# Patient Record
Sex: MALE | Race: BLACK OR AFRICAN AMERICAN | NOT HISPANIC OR LATINO | Employment: FULL TIME | URBAN - METROPOLITAN AREA
[De-identification: names, ages, dates, MRNs, and addresses within clinical notes are randomized per-mention and may not be internally consistent; named-entity substitution may affect disease eponyms.]

---

## 2017-10-15 ENCOUNTER — HOSPITAL ENCOUNTER (EMERGENCY)
Facility: HOSPITAL | Age: 52
Discharge: HOME | End: 2017-10-16
Attending: EMERGENCY MEDICINE
Payer: COMMERCIAL

## 2017-10-15 ENCOUNTER — APPOINTMENT (EMERGENCY)
Dept: CT IMAGING | Facility: HOSPITAL | Age: 52
End: 2017-10-15
Payer: COMMERCIAL

## 2017-10-15 DIAGNOSIS — R10.9 ABDOMINAL PAIN: Primary | ICD-10-CM

## 2017-10-15 LAB
ALBUMIN SERPL BCP-MCNC: 3.1 G/DL (ref 3.5–5)
ALP SERPL-CCNC: 69 U/L (ref 46–116)
ALT SERPL W P-5'-P-CCNC: 28 U/L (ref 12–78)
ANION GAP SERPL CALCULATED.3IONS-SCNC: 8 MMOL/L (ref 4–13)
AST SERPL W P-5'-P-CCNC: 22 U/L (ref 5–45)
BASOPHILS # BLD AUTO: 0.02 THOUSANDS/ΜL (ref 0–0.1)
BASOPHILS NFR BLD AUTO: 0 % (ref 0–1)
BILIRUB SERPL-MCNC: 0.3 MG/DL (ref 0.2–1)
BUN SERPL-MCNC: 13 MG/DL (ref 5–25)
CALCIUM SERPL-MCNC: 8.8 MG/DL (ref 8.3–10.1)
CHLORIDE SERPL-SCNC: 103 MMOL/L (ref 100–108)
CLARITY, POC: CLEAR
CO2 SERPL-SCNC: 28 MMOL/L (ref 21–32)
COLOR, POC: YELLOW
CREAT SERPL-MCNC: 1.13 MG/DL (ref 0.6–1.3)
EOSINOPHIL # BLD AUTO: 0.12 THOUSAND/ΜL (ref 0–0.61)
EOSINOPHIL NFR BLD AUTO: 2 % (ref 0–6)
ERYTHROCYTE [DISTWIDTH] IN BLOOD BY AUTOMATED COUNT: 14.3 % (ref 11.6–15.1)
EXT BILIRUBIN, UA: NEGATIVE
EXT BLOOD URINE: NORMAL
EXT GLUCOSE, UA: NEGATIVE
EXT KETONES: NEGATIVE
EXT NITRITE, UA: NEGATIVE
EXT PH, UA: 6
EXT PROTEIN, UA: NEGATIVE
EXT SPECIFIC GRAVITY, UA: 1.01
EXT UROBILINOGEN: NEGATIVE
GFR SERPL CREATININE-BSD FRML MDRD: 88 ML/MIN/1.73SQ M
GLUCOSE SERPL-MCNC: 83 MG/DL (ref 65–140)
HCT VFR BLD AUTO: 37.8 % (ref 36.5–49.3)
HGB BLD-MCNC: 12.9 G/DL (ref 12–17)
HOLD SPECIMEN: NORMAL
LIPASE SERPL-CCNC: 201 U/L (ref 73–393)
LYMPHOCYTES # BLD AUTO: 2.25 THOUSANDS/ΜL (ref 0.6–4.47)
LYMPHOCYTES NFR BLD AUTO: 33 % (ref 14–44)
MCH RBC QN AUTO: 34.1 PG (ref 26.8–34.3)
MCHC RBC AUTO-ENTMCNC: 34.1 G/DL (ref 31.4–37.4)
MCV RBC AUTO: 100 FL (ref 82–98)
MONOCYTES # BLD AUTO: 0.61 THOUSAND/ΜL (ref 0.17–1.22)
MONOCYTES NFR BLD AUTO: 9 % (ref 4–12)
NEUTROPHILS # BLD AUTO: 3.73 THOUSANDS/ΜL (ref 1.85–7.62)
NEUTS SEG NFR BLD AUTO: 56 % (ref 43–75)
PLATELET # BLD AUTO: 244 THOUSANDS/UL (ref 149–390)
PMV BLD AUTO: 8.6 FL (ref 8.9–12.7)
POTASSIUM SERPL-SCNC: 3.8 MMOL/L (ref 3.5–5.3)
PROT SERPL-MCNC: 6.9 G/DL (ref 6.4–8.2)
RBC # BLD AUTO: 3.78 MILLION/UL (ref 3.88–5.62)
SODIUM SERPL-SCNC: 139 MMOL/L (ref 136–145)
WBC # BLD AUTO: 6.73 THOUSAND/UL (ref 4.31–10.16)
WBC # BLD EST: NEGATIVE 10*3/UL

## 2017-10-15 PROCEDURE — 96361 HYDRATE IV INFUSION ADD-ON: CPT

## 2017-10-15 PROCEDURE — 85025 COMPLETE CBC W/AUTO DIFF WBC: CPT | Performed by: EMERGENCY MEDICINE

## 2017-10-15 PROCEDURE — 93005 ELECTROCARDIOGRAM TRACING: CPT | Performed by: EMERGENCY MEDICINE

## 2017-10-15 PROCEDURE — 36415 COLL VENOUS BLD VENIPUNCTURE: CPT

## 2017-10-15 PROCEDURE — 96375 TX/PRO/DX INJ NEW DRUG ADDON: CPT

## 2017-10-15 PROCEDURE — 74177 CT ABD & PELVIS W/CONTRAST: CPT

## 2017-10-15 PROCEDURE — 96374 THER/PROPH/DIAG INJ IV PUSH: CPT

## 2017-10-15 PROCEDURE — 81002 URINALYSIS NONAUTO W/O SCOPE: CPT | Performed by: EMERGENCY MEDICINE

## 2017-10-15 PROCEDURE — 80053 COMPREHEN METABOLIC PANEL: CPT | Performed by: EMERGENCY MEDICINE

## 2017-10-15 PROCEDURE — 83690 ASSAY OF LIPASE: CPT | Performed by: EMERGENCY MEDICINE

## 2017-10-15 RX ORDER — OXYCODONE AND ACETAMINOPHEN 10; 325 MG/1; MG/1
1 TABLET ORAL EVERY 6 HOURS PRN
COMMUNITY

## 2017-10-15 RX ORDER — ONDANSETRON 2 MG/ML
4 INJECTION INTRAMUSCULAR; INTRAVENOUS ONCE
Status: COMPLETED | OUTPATIENT
Start: 2017-10-15 | End: 2017-10-15

## 2017-10-15 RX ORDER — HYDROCODONE BITARTRATE AND ACETAMINOPHEN 5; 325 MG/1; MG/1
2 TABLET ORAL EVERY 6 HOURS PRN
COMMUNITY

## 2017-10-15 RX ADMIN — FAMOTIDINE 20 MG: 10 INJECTION, SOLUTION INTRAVENOUS at 22:53

## 2017-10-15 RX ADMIN — IOHEXOL 100 ML: 350 INJECTION, SOLUTION INTRAVENOUS at 23:49

## 2017-10-15 RX ADMIN — ONDANSETRON 4 MG: 2 INJECTION INTRAMUSCULAR; INTRAVENOUS at 22:53

## 2017-10-15 RX ADMIN — SODIUM CHLORIDE 1000 ML: 0.9 INJECTION, SOLUTION INTRAVENOUS at 22:53

## 2017-10-16 VITALS
HEART RATE: 63 BPM | TEMPERATURE: 98.7 F | SYSTOLIC BLOOD PRESSURE: 125 MMHG | WEIGHT: 162.92 LBS | DIASTOLIC BLOOD PRESSURE: 75 MMHG | OXYGEN SATURATION: 98 % | RESPIRATION RATE: 18 BRPM

## 2017-10-16 PROCEDURE — 99284 EMERGENCY DEPT VISIT MOD MDM: CPT

## 2017-10-16 RX ORDER — OMEPRAZOLE 20 MG/1
20 CAPSULE, DELAYED RELEASE ORAL DAILY
Qty: 20 CAPSULE | Refills: 0 | Status: SHIPPED | OUTPATIENT
Start: 2017-10-16 | End: 2018-04-16

## 2017-10-16 RX ORDER — METOCLOPRAMIDE 10 MG/1
10 TABLET ORAL 4 TIMES DAILY
Qty: 28 TABLET | Refills: 0 | Status: SHIPPED | OUTPATIENT
Start: 2017-10-16 | End: 2017-10-23

## 2017-10-16 NOTE — ED PROVIDER NOTES
History  Chief Complaint   Patient presents with    Abdominal Pain     Pt  with complaints of right lower quadrant abdominal pain and burning that sometimes radiates down leg  Also complains of two episodes of vomiting with dark blood in emesis, happened three days ago  Now has developed loose stools and "accidents"     Patient is a 54year old male with worsening upper abdominal pain with intermittent nausea and vomiting and diarrhea for past few days and states he vomited dark blood  No abdominal surgery  No travel  No fever  Pain not worsened or relieved by eating  No raw meat, eggs, fish or recent abx use  No recent old records from this ED seen on computer system  [facility] website checked on this patient and last Rx filled was on 10/12/17 for percocet for 12 day supply  Denies NSAID or ASA use  No urinary sx  History provided by:  Patient   used: No    Abdominal Pain   Associated symptoms: diarrhea, nausea and vomiting    Associated symptoms: no fever        Prior to Admission Medications   Prescriptions Last Dose Informant Patient Reported? Taking? HYDROcodone-acetaminophen (NORCO) 5-325 mg per tablet   Yes Yes   Sig: Take 2 tablets by mouth every 6 (six) hours as needed for pain   oxyCODONE-acetaminophen (PERCOCET)  mg per tablet   Yes Yes   Sig: Take 1 tablet by mouth every 6 (six) hours as needed for moderate pain      Facility-Administered Medications: None       Past Medical History:   Diagnosis Date    Back pain     Pinched nerve     Rheumatoid arthritis ([facility] [address] )        Past Surgical History:   Procedure Laterality Date    SURGERY SCROTAL / TESTICULAR Right     right undescended testicle as child       No family history on file  I have reviewed and agree with the history as documented      Social History   Substance Use Topics    Smoking status: Current Every Day Smoker     Packs/day: 0 50    Smokeless tobacco: Never Used    Alcohol use 12 6 oz/week     21 Cans of beer per week      Comment: 3 large cans per day        Review of Systems   Constitutional: Negative for fever  Gastrointestinal: Positive for abdominal pain, diarrhea, nausea and vomiting  Genitourinary: Negative for difficulty urinating  All other systems reviewed and are negative  Physical Exam  ED Triage Vitals   Temperature Pulse Respirations Blood Pressure SpO2   10/15/17 2100 10/15/17 2058 10/15/17 2058 10/15/17 2058 10/15/17 2058   98 7 °F (37 1 °C) 71 18 131/77 100 %      Temp Source Heart Rate Source Patient Position - Orthostatic VS BP Location FiO2 (%)   10/15/17 2100 10/15/17 2058 10/15/17 2058 10/15/17 2058 --   Oral Monitor Lying Right arm       Pain Score       10/15/17 2058       2           Physical Exam   Constitutional: He is oriented to person, place, and time  He appears well-developed and well-nourished  He appears distressed (mild)  HENT:   Head: Normocephalic and atraumatic  Mouth/Throat: Oropharynx is clear and moist    Eyes: No scleral icterus  Neck: No tracheal deviation present  Cardiovascular: Normal rate, regular rhythm and normal heart sounds  No murmur heard  Pulmonary/Chest: Effort normal and breath sounds normal  No stridor  No respiratory distress  Abdominal: Soft  Bowel sounds are normal  He exhibits no distension  There is tenderness (mild upper abdominal tenderness)  There is no rebound and no guarding  Genitourinary: Rectal exam shows guaiac negative stool  Musculoskeletal: He exhibits no edema or deformity  Neurological: He is alert and oriented to person, place, and time  Skin: Skin is warm and dry  No rash noted  Psychiatric: He has a normal mood and affect  Nursing note and vitals reviewed        ED Medications  Medications   sodium chloride 0 9 % bolus 1,000 mL (0 mL Intravenous Stopped 10/15/17 5903)   ondansetron (ZOFRAN) injection 4 mg (4 mg Intravenous Given 10/15/17 2253)   famotidine (PEPCID) injection 20 mg (20 mg Intravenous Given 10/15/17 2416)   iohexol (OMNIPAQUE) 350 MG/ML injection (MULTI-DOSE) 100 mL (100 mL Intravenous Given 10/15/17 2062)       Diagnostic Studies  Labs Reviewed   CBC AND DIFFERENTIAL - Abnormal        Result Value Ref Range Status    RBC 3 78 (*) 3 88 - 5 62 Million/uL Final     (*) 82 - 98 fL Final    MPV 8 6 (*) 8 9 - 12 7 fL Final    WBC 6 73  4 31 - 10 16 Thousand/uL Final    Hemoglobin 12 9  12 0 - 17 0 g/dL Final    Hematocrit 37 8  36 5 - 49 3 % Final    MCH 34 1  26 8 - 34 3 pg Final    MCHC 34 1  31 4 - 37 4 g/dL Final    RDW 14 3  11 6 - 15 1 % Final    Platelets 486  792 - 390 Thousands/uL Final    Neutrophils Relative 56  43 - 75 % Final    Lymphocytes Relative 33  14 - 44 % Final    Monocytes Relative 9  4 - 12 % Final    Eosinophils Relative 2  0 - 6 % Final    Basophils Relative 0  0 - 1 % Final    Neutrophils Absolute 3 73  1 85 - 7 62 Thousands/µL Final    Lymphocytes Absolute 2 25  0 60 - 4 47 Thousands/µL Final    Monocytes Absolute 0 61  0 17 - 1 22 Thousand/µL Final    Eosinophils Absolute 0 12  0 00 - 0 61 Thousand/µL Final    Basophils Absolute 0 02  0 00 - 0 10 Thousands/µL Final   COMPREHENSIVE METABOLIC PANEL - Abnormal     Albumin 3 1 (*) 3 5 - 5 0 g/dL Final    Sodium 139  136 - 145 mmol/L Final    Potassium 3 8  3 5 - 5 3 mmol/L Final    Chloride 103  100 - 108 mmol/L Final    CO2 28  21 - 32 mmol/L Final    Anion Gap 8  4 - 13 mmol/L Final    BUN 13  5 - 25 mg/dL Final    Creatinine 1 13  0 60 - 1 30 mg/dL Final    Comment: Standardized to IDMS reference method    Glucose 83  65 - 140 mg/dL Final    Comment:   If the patient is fasting, the ADA then defines impaired fasting glucose as > 100 mg/dL and diabetes as > or equal to 123 mg/dL  Specimen collection should occur prior to Sulfasalazine administration due to the potential for falsely depressed results   Specimen collection should occur prior to Sulfapyridine administration due to the potential for falsely elevated results  Calcium 8 8  8 3 - 10 1 mg/dL Final    AST 22  5 - 45 U/L Final    Comment:   Specimen collection should occur prior to Sulfasalazine administration due to the potential for falsely depressed results  ALT 28  12 - 78 U/L Final    Comment:   Specimen collection should occur prior to Sulfasalazine administration due to the potential for falsely depressed results  Alkaline Phosphatase 69  46 - 116 U/L Final    Total Protein 6 9  6 4 - 8 2 g/dL Final    Total Bilirubin 0 30  0 20 - 1 00 mg/dL Final    eGFR 88  ml/min/1 73sq m Final    Narrative:     National Kidney Disease Education Program recommendations are as follows:  GFR calculation is accurate only with a steady state creatinine  Chronic Kidney disease less than 60 ml/min/1 73 sq  meters  Kidney failure less than 15 ml/min/1 73 sq  meters  LIPASE - Normal    Lipase 201  73 - 393 u/L Final   POCT URINALYSIS DIPSTICK - Normal    Color, UA yellow   Final    Clarity, UA clear   Final    EXT Glucose, UA negative   Final    EXT Bilirubin, UA (Ref: Negative) negative   Final    EXT Ketones, UA (Ref: Negative) negative   Final    EXT Spec Grav, UA 1 015   Final    EXT Blood, UA (Ref: Negative) small   Final    EXT pH, UA 6 0   Final    EXT Protein, UA (Ref: Negative) negative   Final    EXT Urobilinogen, UA (Ref: 0 2- 1 0) negative   Final    EXT Leukocytes, UA (Ref: Negative) negative   Final    EXT Nitrite, UA (Ref: Negative) negative   Final   LIGHT BLUE TOP   GREEN / BLACK TUBE ON HOLD    Extra Tube Hold for adds-on   Final       CT abdomen pelvis with contrast   ED Interpretation   COMPARISON:   No relevant prior studies available  FINDINGS:   Lower thorax: Mild dependent atelectasis  ABDOMEN:   Liver: Hepatic steatosis  Gallbladder and bile ducts: Partially contracted gallbladder  No   calcified stones  No ductal dilation  Pancreas: Unremarkable  No mass  No ductal dilation  Spleen: Unremarkable  No splenomegaly     Adrenals: Unremarkable  No mass  Kidneys and ureters: Unremarkable  No solid mass  No   hydronephrosis  Stomach and bowel: Abundant fecal material in the colon which may   represent constipation  No   obstruction  No mucosal thickening  Appendix: Visualized without findings to suggest acute   appendicitis  PELVIS:   Bladder: Partially decompressed urinary bladder  Reproductive: Unremarkable as visualized  ABDOMEN and PELVIS:   Intraperitoneal space: Unremarkable  No free air  No significant   fluid collection  Bones/joints: Mild degenerative changes  No acute fracture  No   dislocation  Soft tissues: Unremarkable  Vasculature: Scattered atherosclerotic calcifications  No   abdominal aortic aneurysm  Lymph nodes: No pathologically enlarged lymph nodes  IMPRESSION:   1  Hepatic steatosis  2  Abundant fecal material in the colon which may represent   constipation  Thank you for allowing us to participate in the care of your   patient  Dictated and Authenticated by: Brian Garcia MD   10/16/2017 12:20 AM [place] Time ([id number])          Procedures  ECG 12 Lead Documentation  Date/Time: 10/15/2017 10:49 PM  Performed by: Tata Villa  Authorized by: Tata Villa     Indications / Diagnosis:  Abdominal pain  ECG reviewed by me, the ED Provider: yes    Patient location:  ED  Previous ECG:     Previous ECG:  Unavailable  Interpretation:     Interpretation: normal    Rate:     ECG rate:  60    ECG rate assessment: normal    Rhythm:     Rhythm: sinus rhythm    Ectopy:     Ectopy: none    QRS:     QRS axis:  Normal  Conduction:     Conduction: normal    ST segments:     ST segments:  Normal  T waves:     T waves: normal            Phone Contacts  ED Phone Contact    ED Course  ED Course as of Oct 16 0056   Mon Oct 16, 2017   0051 No acute abdomen prior to discharge                                   MDM  Number of Diagnoses or Management Options  Diagnosis management comments: DDx including but not limited to: appendicitis, gastroenteritis, gastritis, PUD, GERD, hepatitis, pancreatitis, colitis, enteritis, diverticulitis, food poisoning, mesenteric adenitis, mesenteric ischemia, IBD, IBS, ileus, bowel obstruction, volvulus, cholecystitis, biliary colic, choledocholithiasis, AAA,  renal colic, pyelonephritis, UTI; doubt cardiac etiology  Differential diagnosis including but not limited to: upper GI bleed, esophageal varices, gastritis, PUD, diverticulosis, AVM, hemorrhoids, anemia, coagulopathy, liver disease, tumor, anal fissure  Amount and/or Complexity of Data Reviewed  Clinical lab tests: ordered and reviewed  Tests in the radiology section of CPT®: ordered and reviewed  Decide to obtain previous medical records or to obtain history from someone other than the patient: yes  Independent visualization of images, tracings, or specimens: yes      CritCare Time    Disposition  Final diagnoses:   Abdominal pain     ED Disposition     ED Disposition Condition Comment    Discharge  Cesar Ruiz discharge to home/self care  Condition at discharge: Stable        Follow-up Information     Follow up With Specialties Details Why Contact Info    own primary doctor for possible gastroenterologist referral   Call in 1 day Return sooner if increased pain, fever, vomiting, diarrhea, difficulty breathing or urinating, bleeding  Patient's Medications   Discharge Prescriptions    METOCLOPRAMIDE (REGLAN) 10 MG TABLET    Take 1 tablet by mouth 4 (four) times a day for 7 days As needed for nausea       Start Date: 10/16/2017End Date: 10/23/2017       Order Dose: 10 mg       Quantity: 28 tablet    Refills: 0    OMEPRAZOLE (PRILOSEC) 20 MG DELAYED RELEASE CAPSULE    Take 1 capsule by mouth daily for 20 days       Start Date: 10/16/2017End Date: 11/5/2017       Order Dose: 20 mg       Quantity: 20 capsule    Refills: 0     No discharge procedures on file      ED Provider  Electronically Signed by Paco Redmond MD  10/16/17 9381

## 2017-10-16 NOTE — DISCHARGE INSTRUCTIONS
Abdominal Pain   WHAT YOU NEED TO KNOW:   Abdominal pain can be dull, achy, or sharp  You may have pain in one area of your abdomen, or in your entire abdomen  Your pain may be caused by a condition such as constipation, food sensitivity or poisoning, infection, or a blockage  Abdominal pain can also be from a hernia, appendicitis, or an ulcer  Liver, gallbladder, or kidney conditions can also cause abdominal pain  The cause of your abdominal pain may be unknown  DISCHARGE INSTRUCTIONS:   Return to the emergency department if:   · You have new chest pain or shortness of breath  · You have pulsing pain in your upper abdomen or lower back that suddenly becomes constant  · Your pain is in the right lower abdominal area and worsens with movement  · You have a fever over 100 4°F (38°C) or shaking chills  · You are vomiting and cannot keep food or liquids down  · Your pain does not improve or gets worse over the next 8 to 12 hours  · You see blood in your vomit or bowel movements, or they look black and tarry  · Your skin or the whites of your eyes turn yellow  · You are a woman and have a large amount of vaginal bleeding that is not your monthly period  Contact your healthcare provider if:   · You have pain in your lower back  · You are a man and have pain in your testicles  · You have pain when you urinate  · You have questions or concerns about your condition or care  Follow up with your healthcare provider within 24 hours or as directed:  Write down your questions so you remember to ask them during your visits  Medicines:   · Medicines  may be given to calm your stomach and prevent vomiting or to decrease pain  Ask how to take pain medicine safely  · Take your medicine as directed  Contact your healthcare provider if you think your medicine is not helping or if you have side effects  Tell him of her if you are allergic to any medicine   Keep a list of the medicines, vitamins, and herbs you take  Include the amounts, and when and why you take them  Bring the list or the pill bottles to follow-up visits  Carry your medicine list with you in case of an emergency  © 2017 [address] [employer] Information is for End User's use only and may not be sold, redistributed or otherwise used for commercial purposes  All illustrations and images included in CareNotes® are the copyrighted property of [employer]  or [employer]  The above information is an  only  It is not intended as medical advice for individual conditions or treatments  Talk to your doctor, nurse or pharmacist before following any medical regimen to see if it is safe and effective for you

## 2017-10-19 LAB
ATRIAL RATE: 60 BPM
P AXIS: 78 DEGREES
PR INTERVAL: 168 MS
QRS AXIS: 81 DEGREES
QRSD INTERVAL: 88 MS
QT INTERVAL: 414 MS
QTC INTERVAL: 414 MS
T WAVE AXIS: 77 DEGREES
VENTRICULAR RATE: 60 BPM

## 2018-04-16 ENCOUNTER — APPOINTMENT (EMERGENCY)
Dept: RADIOLOGY | Facility: HOSPITAL | Age: 53
End: 2018-04-16
Payer: COMMERCIAL

## 2018-04-16 ENCOUNTER — HOSPITAL ENCOUNTER (EMERGENCY)
Facility: HOSPITAL | Age: 53
Discharge: HOME | End: 2018-04-16
Attending: EMERGENCY MEDICINE
Payer: COMMERCIAL

## 2018-04-16 VITALS
DIASTOLIC BLOOD PRESSURE: 81 MMHG | WEIGHT: 171.96 LBS | TEMPERATURE: 98.1 F | SYSTOLIC BLOOD PRESSURE: 122 MMHG | RESPIRATION RATE: 18 BRPM | HEART RATE: 60 BPM | OXYGEN SATURATION: 100 %

## 2018-04-16 DIAGNOSIS — R07.9 CHEST PAIN, UNSPECIFIED TYPE: Primary | ICD-10-CM

## 2018-04-16 LAB
ALBUMIN SERPL BCP-MCNC: 3.3 G/DL (ref 3.5–5)
ALP SERPL-CCNC: 69 U/L (ref 46–116)
ALT SERPL W P-5'-P-CCNC: 31 U/L (ref 12–78)
ANION GAP SERPL CALCULATED.3IONS-SCNC: 7 MMOL/L (ref 4–13)
AST SERPL W P-5'-P-CCNC: 23 U/L (ref 5–45)
BASOPHILS # BLD AUTO: 0.01 THOUSANDS/ΜL (ref 0–0.1)
BASOPHILS NFR BLD AUTO: 0 % (ref 0–1)
BILIRUB SERPL-MCNC: 0.4 MG/DL (ref 0.2–1)
BUN SERPL-MCNC: 10 MG/DL (ref 5–25)
CALCIUM SERPL-MCNC: 9.1 MG/DL (ref 8.3–10.1)
CHLORIDE SERPL-SCNC: 106 MMOL/L (ref 100–108)
CO2 SERPL-SCNC: 30 MMOL/L (ref 21–32)
CREAT SERPL-MCNC: 0.97 MG/DL (ref 0.6–1.3)
EOSINOPHIL # BLD AUTO: 0.13 THOUSAND/ΜL (ref 0–0.61)
EOSINOPHIL NFR BLD AUTO: 2 % (ref 0–6)
ERYTHROCYTE [DISTWIDTH] IN BLOOD BY AUTOMATED COUNT: 14.3 % (ref 11.6–15.1)
GFR SERPL CREATININE-BSD FRML MDRD: 105 ML/MIN/1.73SQ M
GLUCOSE SERPL-MCNC: 90 MG/DL (ref 65–140)
HCT VFR BLD AUTO: 39.5 % (ref 36.5–49.3)
HGB BLD-MCNC: 13.3 G/DL (ref 12–17)
LYMPHOCYTES # BLD AUTO: 2.56 THOUSANDS/ΜL (ref 0.6–4.47)
LYMPHOCYTES NFR BLD AUTO: 40 % (ref 14–44)
MCH RBC QN AUTO: 33.2 PG (ref 26.8–34.3)
MCHC RBC AUTO-ENTMCNC: 33.7 G/DL (ref 31.4–37.4)
MCV RBC AUTO: 99 FL (ref 82–98)
MONOCYTES # BLD AUTO: 0.39 THOUSAND/ΜL (ref 0.17–1.22)
MONOCYTES NFR BLD AUTO: 6 % (ref 4–12)
NEUTROPHILS # BLD AUTO: 3.31 THOUSANDS/ΜL (ref 1.85–7.62)
NEUTS SEG NFR BLD AUTO: 52 % (ref 43–75)
PLATELET # BLD AUTO: 261 THOUSANDS/UL (ref 149–390)
PMV BLD AUTO: 8.5 FL (ref 8.9–12.7)
POTASSIUM SERPL-SCNC: 3.8 MMOL/L (ref 3.5–5.3)
PROT SERPL-MCNC: 7.1 G/DL (ref 6.4–8.2)
RBC # BLD AUTO: 4.01 MILLION/UL (ref 3.88–5.62)
SODIUM SERPL-SCNC: 143 MMOL/L (ref 136–145)
TROPONIN I SERPL-MCNC: <0.02 NG/ML
WBC # BLD AUTO: 6.4 THOUSAND/UL (ref 4.31–10.16)

## 2018-04-16 PROCEDURE — 36415 COLL VENOUS BLD VENIPUNCTURE: CPT | Performed by: EMERGENCY MEDICINE

## 2018-04-16 PROCEDURE — 80053 COMPREHEN METABOLIC PANEL: CPT | Performed by: EMERGENCY MEDICINE

## 2018-04-16 PROCEDURE — 85025 COMPLETE CBC W/AUTO DIFF WBC: CPT | Performed by: EMERGENCY MEDICINE

## 2018-04-16 PROCEDURE — 93005 ELECTROCARDIOGRAM TRACING: CPT

## 2018-04-16 PROCEDURE — 84484 ASSAY OF TROPONIN QUANT: CPT | Performed by: EMERGENCY MEDICINE

## 2018-04-16 PROCEDURE — 99285 EMERGENCY DEPT VISIT HI MDM: CPT

## 2018-04-16 PROCEDURE — 71046 X-RAY EXAM CHEST 2 VIEWS: CPT

## 2018-04-16 RX ORDER — ASPIRIN 81 MG/1
324 TABLET, CHEWABLE ORAL ONCE
Status: COMPLETED | OUTPATIENT
Start: 2018-04-16 | End: 2018-04-16

## 2018-04-16 RX ADMIN — ASPIRIN 81 MG 324 MG: 81 TABLET ORAL at 19:20

## 2018-04-17 LAB
ATRIAL RATE: 61 BPM
P AXIS: 80 DEGREES
PR INTERVAL: 164 MS
QRS AXIS: 79 DEGREES
QRSD INTERVAL: 92 MS
QT INTERVAL: 406 MS
QTC INTERVAL: 408 MS
T WAVE AXIS: 76 DEGREES
VENTRICULAR RATE: 61 BPM

## 2018-04-17 PROCEDURE — 93010 ELECTROCARDIOGRAM REPORT: CPT | Performed by: INTERNAL MEDICINE

## 2018-04-17 NOTE — DISCHARGE INSTRUCTIONS
Chest Pain   WHAT YOU NEED TO KNOW:   Chest pain can be caused by a range of conditions, from not serious to life-threatening  Chest pain can be a symptom of a digestive problem, such as acid reflux or a stomach ulcer  An anxiety attack or a strong emotion, such as anger, can also cause chest pain  Infection, inflammation, or a fracture in the bones or cartilage in your chest can cause pain or discomfort  Sometimes chest pain or pressure is caused by poor blood flow to your heart (angina)  Chest pain may also be caused by life-threatening conditions such as a heart attack or blood clot in your lungs  DISCHARGE INSTRUCTIONS:   Call 911 if:   · You have any of the following signs of a heart attack:      ¨ Squeezing, pressure, or pain in your chest that lasts longer than 5 minutes or returns    ¨ Discomfort or pain in your back, neck, jaw, stomach, or arm     ¨ Trouble breathing    ¨ Nausea or vomiting    ¨ Lightheadedness or a sudden cold sweat, especially with chest pain or trouble breathing    Return to the emergency department if:   · You have chest discomfort that gets worse, even with medicine  · You cough or vomit blood  · Your bowel movements are black or bloody  · You cannot stop vomiting, or it hurts to swallow  Contact your healthcare provider if:   · You have questions or concerns about your condition or care  Medicines:   · Medicines  may be given to treat the cause of your chest pain  Examples include pain medicine, anxiety medicine, or medicines to increase blood flow to your heart  · Do not take certain medicines without asking your healthcare provider first   These include NSAIDs, herbal or vitamin supplements, or hormones (estrogen or progestin)  · Take your medicine as directed  Contact your healthcare provider if you think your medicine is not helping or if you have side effects  Tell him or her if you are allergic to any medicine   Keep a list of the medicines, vitamins, and herbs you take  Include the amounts, and when and why you take them  Bring the list or the pill bottles to follow-up visits  Carry your medicine list with you in case of an emergency  Follow up with your healthcare provider within 72 hours, or as directed: You may need to return for more tests to find the cause of your chest pain  You may be referred to a specialist, such as a cardiologist or gastroenterologist  Write down your questions so you remember to ask them during your visits  Healthy living tips: The following are general healthy guidelines  If your chest pain is caused by a heart problem, your healthcare provider will give you specific guidelines to follow  · Do not smoke  Nicotine and other chemicals in cigarettes and cigars can cause lung and heart damage  Ask your healthcare provider for information if you currently smoke and need help to quit  E-cigarettes or smokeless tobacco still contain nicotine  Talk to your healthcare provider before you use these products  · Eat a variety of healthy, low-fat foods  Healthy foods include fruits, vegetables, whole-grain breads, low-fat dairy products, beans, lean meats, and fish  Ask for more information about a heart healthy diet  · Ask about activity  Your healthcare provider will tell you which activities to limit or avoid  Ask when you can drive, return to work, and have sex  Ask about the best exercise plan for you  · Maintain a healthy weight  Ask your healthcare provider how much you should weigh  Ask him or her to help you create a weight loss plan if you are overweight  · Get the flu and pneumonia vaccines  All adults should get the influenza (flu) vaccine  Get it every year as soon as it becomes available  The pneumococcal vaccine is given to adults aged 72 years or older  The vaccine is given every 5 years to prevent pneumococcal disease, such as pneumonia    © 2017 [employer] Information is for End User's use only and may not be sold, redistributed or otherwise used for commercial purposes  All illustrations and images included in CareNotes® are the copyrighted property of [employer]  or [employer]  The above information is an  only  It is not intended as medical advice for individual conditions or treatments  Talk to your doctor, nurse or pharmacist before following any medical regimen to see if it is safe and effective for you

## 2018-04-17 NOTE — ED PROVIDER NOTES
History  Chief Complaint   Patient presents with    Chest Pain     Patient reports left sided chest pain x 1 week that is off and on  Also stated, "one time my arm got tingly, but it went away "  Denies sob  Pain does not radiate  This 53-year-old male presents with 2-3 day history of some chest tightness  Patient also complaining of some left arm tingling that has been going on for a week  Patient does have rheumatoid arthritis with history of pinched nerves  Patient describes the chest tightness as fairly constant over the course of the last two days  Patient does not know of anything exacerbating or relieving it  Patient denies any shortness of breath, nausea, diaphoresis, syncope  Patient denies any injury  Patient denies any history of similar pains in past         History provided by:  Patient   used: No    Chest Pain   Pain location:  L chest  Pain quality: pressure    Pain radiates to:  L arm  Pain radiates to the back: no    Pain severity:  Mild  Onset quality:  Gradual  Duration:  2 days  Timing:  Constant  Progression:  Unchanged  Chronicity:  New  Relieved by:  None tried  Worsened by:  Nothing tried  Ineffective treatments:  None tried  Associated symptoms: numbness    Associated symptoms: no abdominal pain, no back pain, no cough, no diaphoresis, no dizziness, no fever, no headache, no nausea, no palpitations, no shortness of breath, not vomiting and no weakness    Risk factors: male sex        Prior to Admission Medications   Prescriptions Last Dose Informant Patient Reported? Taking?    HYDROcodone-acetaminophen (NORCO) 5-325 mg per tablet   Yes No   Sig: Take 2 tablets by mouth every 6 (six) hours as needed for pain   oxyCODONE-acetaminophen (PERCOCET)  mg per tablet   Yes No   Sig: Take 1 tablet by mouth every 6 (six) hours as needed for moderate pain      Facility-Administered Medications: None       Past Medical History:   Diagnosis Date    Back pain     Pinched nerve     Rheumatoid arthritis ([facility] [address] )        Past Surgical History:   Procedure Laterality Date    SURGERY SCROTAL / TESTICULAR Right     right undescended testicle as child       History reviewed  No pertinent family history  I have reviewed and agree with the history as documented  Social History   Substance Use Topics    Smoking status: Former Smoker     Packs/day: 0 50    Smokeless tobacco: Never Used    Alcohol use 12 6 oz/week     21 Cans of beer per week      Comment: 3 large cans per day        Review of Systems   Constitutional: Negative for activity change, appetite change, diaphoresis and fever  HENT: Negative for ear pain, facial swelling, sore throat, tinnitus and voice change  Eyes: Negative for photophobia, pain and redness  Respiratory: Negative for cough, chest tightness, shortness of breath and wheezing  Cardiovascular: Positive for chest pain  Negative for palpitations and leg swelling  Gastrointestinal: Negative for abdominal distention, abdominal pain, constipation, diarrhea, nausea and vomiting  Genitourinary: Negative for difficulty urinating, dysuria, flank pain, hematuria and urgency  Musculoskeletal: Negative for back pain, gait problem and neck pain  Skin: Negative for rash and wound  Neurological: Positive for numbness  Negative for dizziness, syncope, speech difficulty, weakness and headaches  Psychiatric/Behavioral: Negative for agitation, behavioral problems and confusion         Physical Exam  ED Triage Vitals   Temperature Pulse Respirations Blood Pressure SpO2   04/16/18 1757 04/16/18 1755 04/16/18 1755 04/16/18 1755 04/16/18 1755   98 1 °F (36 7 °C) 77 18 119/69 100 %      Temp Source Heart Rate Source Patient Position - Orthostatic VS BP Location FiO2 (%)   04/16/18 1757 04/16/18 1755 04/16/18 1755 04/16/18 1755 --   Oral Monitor Sitting Left arm       Pain Score       04/16/18 1755       2           Orthostatic Vital Signs  Vitals:    04/16/18 1755 04/16/18 2022   BP: 119/69 122/81   Pulse: 77 60   Patient Position - Orthostatic VS: Sitting Lying       Physical Exam   Constitutional: He is oriented to person, place, and time  He appears well-developed and well-nourished  He is cooperative  No distress  HENT:   Head: Normocephalic and atraumatic  Mouth/Throat: Oropharynx is clear and moist    Eyes: EOM and lids are normal  Pupils are equal, round, and reactive to light  Right eye exhibits no discharge  Left eye exhibits no discharge  Right conjunctiva is not injected  Left conjunctiva is not injected  Neck: Trachea normal, normal range of motion, full passive range of motion without pain and phonation normal  Neck supple  Cardiovascular: Normal rate, regular rhythm, normal heart sounds, intact distal pulses and normal pulses  No murmur heard  Pulses:       Dorsalis pedis pulses are 2+ on the right side, and 2+ on the left side  Pulmonary/Chest: Effort normal and breath sounds normal  He exhibits no tenderness  Abdominal: Soft  He exhibits no distension  There is no tenderness  Musculoskeletal: Normal range of motion  He exhibits no edema  Neurological: He is alert and oriented to person, place, and time  He has normal strength  No cranial nerve deficit or sensory deficit  Coordination normal  GCS eye subscore is 4  GCS verbal subscore is 5  GCS motor subscore is 6  Skin: Skin is warm, dry and intact  Capillary refill takes less than 2 seconds  No rash noted  Psychiatric: He has a normal mood and affect  His speech is normal and behavior is normal    Vitals reviewed        ED Medications  Medications   aspirin chewable tablet 324 mg (324 mg Oral Given 4/16/18 1920)       Diagnostic Studies  Results Reviewed     Procedure Component Value Units Date/Time    Troponin I [[id number]]  (Normal) Collected:  04/16/18 1900    Lab Status:  Final result Specimen:  Blood from Arm, Left Updated:  04/16/18 1934     Troponin I <0 02 ng/mL Narrative:         [employer] Chemistry analyzer 99% cutoff is > 0 04 ng/mL in network labs    o cTnI 99% cutoff is useful only when applied to patients in the clinical setting of myocardial ischemia  o cTnI 99% cutoff should be interpreted in the context of clinical history, ECG findings and possibly cardiac imaging to establish correct diagnosis  o cTnI 99% cutoff may be suggestive but clearly not indicative of a coronary event without the clinical setting of myocardial ischemia  Comprehensive metabolic panel [[id number]]  (Abnormal) Collected:  04/16/18 1900    Lab Status:  Final result Specimen:  Blood from Arm, Left Updated:  04/16/18 1932     Sodium 143 mmol/L      Potassium 3 8 mmol/L      Chloride 106 mmol/L      CO2 30 mmol/L      Anion Gap 7 mmol/L      BUN 10 mg/dL      Creatinine 0 97 mg/dL      Glucose 90 mg/dL      Calcium 9 1 mg/dL      AST 23 U/L      ALT 31 U/L      Alkaline Phosphatase 69 U/L      Total Protein 7 1 g/dL      Albumin 3 3 (L) g/dL      Total Bilirubin 0 40 mg/dL      eGFR 105 ml/min/1 73sq m     Narrative:         National Kidney Disease Education Program recommendations are as follows:  GFR calculation is accurate only with a steady state creatinine  Chronic Kidney disease less than 60 ml/min/1 73 sq  meters  Kidney failure less than 15 ml/min/1 73 sq  meters      CBC and differential [[id number]]  (Abnormal) Collected:  04/16/18 1859    Lab Status:  Final result Specimen:  Blood from Arm, Left Updated:  04/16/18 1908     WBC 6 40 Thousand/uL      RBC 4 01 Million/uL      Hemoglobin 13 3 g/dL      Hematocrit 39 5 %      MCV 99 (H) fL      MCH 33 2 pg      MCHC 33 7 g/dL      RDW 14 3 %      MPV 8 5 (L) fL      Platelets 279 Thousands/uL      Neutrophils Relative 52 %      Lymphocytes Relative 40 %      Monocytes Relative 6 %      Eosinophils Relative 2 %      Basophils Relative 0 %      Neutrophils Absolute 3 31 Thousands/µL      Lymphocytes Absolute 2 56 Thousands/µL      Monocytes Absolute 0 39 Thousand/µL      Eosinophils Absolute 0 13 Thousand/µL      Basophils Absolute 0 01 Thousands/µL                  X-ray chest 2 views   Final Result by Talita Randall MD (04/16 1925)      No acute cardiopulmonary disease  Workstation performed: [id number]                    Procedures  ECG 12 Lead Documentation  Date/Time: 4/16/2018 8:25 PM  Performed by: Jam Seay  Authorized by: Jam Seay     Indications / Diagnosis:  Chest Pain  ECG reviewed by me, the ED Provider: yes    Patient location:  ED  Previous ECG:     Previous ECG:  Unavailable  Interpretation:     Interpretation: normal    Rate:     ECG rate:  61    ECG rate assessment: normal    Rhythm:     Rhythm: sinus rhythm    Ectopy:     Ectopy: none    QRS:     QRS axis:  Normal    QRS intervals:  Normal  Conduction:     Conduction: normal    ST segments:     ST segments:  Normal  T waves:     T waves: normal             Phone Contacts  ED Phone Contact    ED Course  ED Course          HEART Risk Score    Flowsheet Row Most Recent Value   History  1 Filed at: 04/16/2018 2021   ECG  0 Filed at: 04/16/2018 2021   Age  1 Filed at: 04/16/2018 2021   Risk Factors  1 Filed at: 04/16/2018 2021   Troponin  0 Filed at: 04/16/2018 2021   Heart Score Risk Calculator   History  1 Filed at: 04/16/2018 2021   ECG  0 Filed at: 04/16/2018 2021   Age  1 Filed at: 04/16/2018 2021   Risk Factors  1 Filed at: 04/16/2018 2021   Troponin  0 Filed at: 04/16/2018 2021   HEART Score  3 Filed at: 04/16/2018 2021   HEART Score  3 Filed at: 04/16/2018 2021                            MDM  Number of Diagnoses or Management Options  Diagnosis management comments: Patient with heart score of three, unremarkable EKG  Patient will be discharged home to follow up primary care doctor as low risk chest pain         Amount and/or Complexity of Data Reviewed  Clinical lab tests: ordered and reviewed  Tests in the radiology section of CPT®: ordered and reviewed  Tests in the medicine section of CPT®: ordered and reviewed  Independent visualization of images, tracings, or specimens: yes    Risk of Complications, Morbidity, and/or Mortality  Presenting problems: high  Diagnostic procedures: high  Management options: moderate    Patient Progress  Patient progress: stable    CritCare Time    Disposition  Final diagnoses:   Chest pain, unspecified type     Time reflects when diagnosis was documented in both MDM as applicable and the Disposition within this note     Time User Action Codes Description Comment    4/16/2018  8:26 PM Carrie Salgado Add [R07 9] Chest pain, unspecified type       ED Disposition     ED Disposition Condition Comment    Discharge  Nano Miller discharge to home/self care  Condition at discharge: Stable        Follow-up Information     Follow up With Specialties Details [address]  In 1 day For further evaluation of your chest pain symptoms as an outpatient, including possible stress test  [phone]          Patient's Medications   Discharge Prescriptions    No medications on file     No discharge procedures on file      ED Provider  Electronically Signed by           Pham Buchanan MD  04/16/18 2027

## 2022-05-13 ENCOUNTER — APPOINTMENT (OUTPATIENT)
Dept: RADIOLOGY | Facility: CLINIC | Age: 57
End: 2022-05-13

## 2022-05-13 ENCOUNTER — OCCMED (OUTPATIENT)
Dept: URGENT CARE | Facility: CLINIC | Age: 57
End: 2022-05-13
Payer: COMMERCIAL

## 2022-05-13 DIAGNOSIS — M54.40 BILATERAL LOW BACK PAIN WITH SCIATICA, SCIATICA LATERALITY UNSPECIFIED, UNSPECIFIED CHRONICITY: ICD-10-CM

## 2022-05-13 DIAGNOSIS — M54.16 LUMBAR RADICULITIS: ICD-10-CM

## 2022-05-13 DIAGNOSIS — M54.40 BILATERAL LOW BACK PAIN WITH SCIATICA, SCIATICA LATERALITY UNSPECIFIED, UNSPECIFIED CHRONICITY: Primary | ICD-10-CM

## 2022-05-13 PROCEDURE — 72100 X-RAY EXAM L-S SPINE 2/3 VWS: CPT

## 2022-05-13 PROCEDURE — G0382 LEV 3 HOSP TYPE B ED VISIT: HCPCS | Performed by: PHYSICIAN ASSISTANT

## 2022-05-20 ENCOUNTER — OCCMED (OUTPATIENT)
Dept: URGENT CARE | Facility: CLINIC | Age: 57
End: 2022-05-20
Payer: OTHER MISCELLANEOUS
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
## 2022-05-20 PROCEDURE — 99213 OFFICE O/P EST LOW 20 MIN: CPT

## 2022-05-20 NOTE — PROGRESS NOTES
This encounter was created for [facility] orders only   [address] Now        NAME: Farhat Calderon is a 49 y o  male  : 1965    MRN: [id number]  DATE: May 20, 2022  TIME: 6:25 PM    There were no vitals taken for this visit  Assessment and Plan   No primary diagnosis found  No diagnosis found  Patient Instructions       Follow up with PCP in 3-5 days  Proceed to  ER if symptoms worsen  Chief Complaint   No chief complaint on file  History of Present Illness       HPI    Review of Systems   Review of Systems      Current Medications       Current Outpatient Medications:     HYDROcodone-acetaminophen (NORCO) 5-325 mg per tablet, Take 2 tablets by mouth every 6 (six) hours as needed for pain, Disp: , Rfl:     oxyCODONE-acetaminophen (PERCOCET)  mg per tablet, Take 1 tablet by mouth every 6 (six) hours as needed for moderate pain, Disp: , Rfl:     Current Allergies     Allergies as of 2022    (No Known Allergies)            The following portions of the patient's history were reviewed and updated as appropriate: allergies, current medications, past family history, past medical history, past social history, past surgical history and problem list      Past Medical History:   Diagnosis Date    Back pain     Pinched nerve     Rheumatoid arthritis ([address] )        Past Surgical History:   Procedure Laterality Date    SURGERY SCROTAL / TESTICULAR Right     right undescended testicle as child       No family history on file  Medications have been verified  Objective   There were no vitals taken for this visit         Physical Exam     Physical Exam

## 2022-05-27 ENCOUNTER — APPOINTMENT (OUTPATIENT)
Dept: URGENT CARE | Facility: CLINIC | Age: 57
End: 2022-05-27
Payer: OTHER MISCELLANEOUS

## 2022-05-27 PROCEDURE — 99213 OFFICE O/P EST LOW 20 MIN: CPT

## 2022-06-01 ENCOUNTER — TELEPHONE (OUTPATIENT)
Dept: OBGYN CLINIC | Facility: HOSPITAL | Age: 57
End: 2022-06-01

## 2022-06-12 ENCOUNTER — HOSPITAL ENCOUNTER (EMERGENCY)
Facility: HOSPITAL | Age: 57
Discharge: HOME | End: 2022-06-12
Attending: EMERGENCY MEDICINE
Payer: COMMERCIAL

## 2022-06-12 ENCOUNTER — APPOINTMENT (EMERGENCY)
Dept: RADIOLOGY | Facility: HOSPITAL | Age: 57
End: 2022-06-12
Payer: COMMERCIAL

## 2022-06-12 VITALS
RESPIRATION RATE: 20 BRPM | WEIGHT: 159.61 LBS | DIASTOLIC BLOOD PRESSURE: 70 MMHG | HEART RATE: 58 BPM | OXYGEN SATURATION: 100 % | SYSTOLIC BLOOD PRESSURE: 115 MMHG | TEMPERATURE: 97.8 F

## 2022-06-12 DIAGNOSIS — R07.89 ATYPICAL CHEST PAIN: Primary | ICD-10-CM

## 2022-06-12 LAB
ALBUMIN SERPL BCP-MCNC: 3.9 G/DL (ref 3.5–5)
ALP SERPL-CCNC: 55 U/L (ref 34–104)
ALT SERPL W P-5'-P-CCNC: 16 U/L (ref 7–52)
ANION GAP SERPL CALCULATED.3IONS-SCNC: 5 MMOL/L (ref 4–13)
AST SERPL W P-5'-P-CCNC: 18 U/L (ref 13–39)
BASOPHILS # BLD AUTO: 0.02 THOUSANDS/ΜL (ref 0–0.1)
BASOPHILS NFR BLD AUTO: 0 % (ref 0–1)
BILIRUB SERPL-MCNC: 0.4 MG/DL (ref 0.2–1)
BUN SERPL-MCNC: 13 MG/DL (ref 5–25)
CALCIUM SERPL-MCNC: 8.9 MG/DL (ref 8.4–10.2)
CARDIAC TROPONIN I PNL SERPL HS: 2 NG/L
CHLORIDE SERPL-SCNC: 103 MMOL/L (ref 96–108)
CO2 SERPL-SCNC: 30 MMOL/L (ref 21–32)
CREAT SERPL-MCNC: 1.01 MG/DL (ref 0.6–1.3)
EOSINOPHIL # BLD AUTO: 0.15 THOUSAND/ΜL (ref 0–0.61)
EOSINOPHIL NFR BLD AUTO: 2 % (ref 0–6)
ERYTHROCYTE [DISTWIDTH] IN BLOOD BY AUTOMATED COUNT: 15 % (ref 11.6–15.1)
GFR SERPL CREATININE-BSD FRML MDRD: 83 ML/MIN/1.73SQ M
GLUCOSE SERPL-MCNC: 93 MG/DL (ref 65–140)
HCT VFR BLD AUTO: 38.3 % (ref 36.5–49.3)
HGB BLD-MCNC: 12.7 G/DL (ref 12–17)
IMM GRANULOCYTES # BLD AUTO: 0.03 THOUSAND/UL (ref 0–0.2)
IMM GRANULOCYTES NFR BLD AUTO: 0 % (ref 0–2)
LYMPHOCYTES # BLD AUTO: 1.86 THOUSANDS/ΜL (ref 0.6–4.47)
LYMPHOCYTES NFR BLD AUTO: 24 % (ref 14–44)
MCH RBC QN AUTO: 34.1 PG (ref 26.8–34.3)
MCHC RBC AUTO-ENTMCNC: 33.2 G/DL (ref 31.4–37.4)
MCV RBC AUTO: 103 FL (ref 82–98)
MONOCYTES # BLD AUTO: 0.77 THOUSAND/ΜL (ref 0.17–1.22)
MONOCYTES NFR BLD AUTO: 10 % (ref 4–12)
NEUTROPHILS # BLD AUTO: 4.88 THOUSANDS/ΜL (ref 1.85–7.62)
NEUTS SEG NFR BLD AUTO: 64 % (ref 43–75)
NRBC BLD AUTO-RTO: 0 /100 WBCS
PLATELET # BLD AUTO: 204 THOUSANDS/UL (ref 149–390)
PMV BLD AUTO: 9.4 FL (ref 8.9–12.7)
POTASSIUM SERPL-SCNC: 4 MMOL/L (ref 3.5–5.3)
PROT SERPL-MCNC: 6.7 G/DL (ref 6.4–8.4)
RBC # BLD AUTO: 3.72 MILLION/UL (ref 3.88–5.62)
SODIUM SERPL-SCNC: 138 MMOL/L (ref 135–147)
WBC # BLD AUTO: 7.71 THOUSAND/UL (ref 4.31–10.16)

## 2022-06-12 PROCEDURE — 71046 X-RAY EXAM CHEST 2 VIEWS: CPT

## 2022-06-12 PROCEDURE — 96374 THER/PROPH/DIAG INJ IV PUSH: CPT

## 2022-06-12 PROCEDURE — 85025 COMPLETE CBC W/AUTO DIFF WBC: CPT | Performed by: PHYSICIAN ASSISTANT

## 2022-06-12 PROCEDURE — 99285 EMERGENCY DEPT VISIT HI MDM: CPT

## 2022-06-12 PROCEDURE — 36415 COLL VENOUS BLD VENIPUNCTURE: CPT | Performed by: PHYSICIAN ASSISTANT

## 2022-06-12 PROCEDURE — 99285 EMERGENCY DEPT VISIT HI MDM: CPT | Performed by: PHYSICIAN ASSISTANT

## 2022-06-12 PROCEDURE — 80053 COMPREHEN METABOLIC PANEL: CPT | Performed by: PHYSICIAN ASSISTANT

## 2022-06-12 PROCEDURE — 93005 ELECTROCARDIOGRAM TRACING: CPT

## 2022-06-12 PROCEDURE — 84484 ASSAY OF TROPONIN QUANT: CPT | Performed by: PHYSICIAN ASSISTANT

## 2022-06-12 PROCEDURE — 96361 HYDRATE IV INFUSION ADD-ON: CPT

## 2022-06-12 RX ORDER — KETOROLAC TROMETHAMINE 30 MG/ML
30 INJECTION, SOLUTION INTRAMUSCULAR; INTRAVENOUS ONCE
Status: COMPLETED | OUTPATIENT
Start: 2022-06-12 | End: 2022-06-12

## 2022-06-12 RX ADMIN — KETOROLAC TROMETHAMINE 30 MG: 30 INJECTION, SOLUTION INTRAMUSCULAR at 11:30

## 2022-06-12 RX ADMIN — SODIUM CHLORIDE 1000 ML: 0.9 INJECTION, SOLUTION INTRAVENOUS at 10:45

## 2022-06-12 NOTE — Clinical Note
Sruthi Jarrell was seen and treated in our emergency department on 6/12/2022  No restrictions            Diagnosis:     Avis    He may return on this date: 06/13/2022         If you have any questions or concerns, please don't hesitate to call        Sanket Pate PA-C    ______________________________           _______________          _______________  Hospital Representative                              Date                                Time

## 2022-06-12 NOTE — ED PROVIDER NOTES
History  Chief Complaint   Patient presents with    Chest Pain     Per pt  "He had a sudden onset left side chest pain which radiates into his left armpit while driving to work this morning, denies any N/V "     62-year-old male presents emergency department with complaints of left-sided chest pain  States that today he was driving to work approximately 5 hours ago when he started to develop some sharp left-sided chest pain  The pain was sharp in nature and radiated into the left arm  States the pain is worse when taking a deep breath or with certain movements  He denies shortness of breath at rest   No nausea vomiting, or diaphoresis  Denies history of similar  Do not take anything for symptoms prior to arrival       History provided by:  Patient   used: No        Prior to Admission Medications   Prescriptions Last Dose Informant Patient Reported? Taking? HYDROcodone-acetaminophen (NORCO) 5-325 mg per tablet   Yes No   Sig: Take 2 tablets by mouth every 6 (six) hours as needed for pain   oxyCODONE-acetaminophen (PERCOCET)  mg per tablet   Yes No   Sig: Take 1 tablet by mouth every 6 (six) hours as needed for moderate pain      Facility-Administered Medications: None       Past Medical History:   Diagnosis Date    Back pain     Pinched nerve     Rhe[facility]oid arthritis ([facility] [address] )        Past Surgical History:   Procedure Laterality Date    SURGERY SCROTAL / TESTICULAR Right     right undescended testicle as child       History reviewed  No pertinent family history  I have reviewed and agree with the history as documented  E-Cigarette/Vaping     E-Cigarette/Vaping Substances     Social History     Tobacco Use    Smoking status: Former Smoker     Packs/day: 0 50    Smokeless tobacco: Never Used   Substance Use Topics    Alcohol use:  Yes     Alcohol/week: 21 0 standard drinks     Types: 21 Cans of beer per week     Comment: 3 large cans per day    Drug use: No       Review of Systems   Constitutional: Negative for activity change, appetite change, chills and fever  HENT: Negative for congestion, dental problem, drooling, ear discharge, ear pain, mouth sores, nosebleeds, rhinorrhea, sore throat and trouble swallowing  Eyes: Negative for pain, discharge and itching  Respiratory: Negative for cough, chest tightness, shortness of breath and wheezing  Cardiovascular: Positive for chest pain  Negative for palpitations  Gastrointestinal: Negative for abdominal pain, blood in stool, constipation, diarrhea, nausea and vomiting  Endocrine: Negative for cold intolerance and heat intolerance  Genitourinary: Negative for difficulty urinating, dysuria, flank pain, frequency and urgency  Allergic/Immunologic: Negative for food allergies and immunocompromised state  Neurological: Negative for dizziness, seizures, syncope, weakness, numbness and headaches  Psychiatric/Behavioral: Negative for agitation, behavioral problems and confusion  Physical Exam  Physical Exam  Vitals and nursing note reviewed  Constitutional:       General: He is not in acute distress  Appearance: He is not diaphoretic  HENT:      Head: Normocephalic and atraumatic  Right Ear: External ear normal       Left Ear: External ear normal       Mouth/Throat:      Pharynx: No oropharyngeal exudate  Eyes:      Conjunctiva/sclera: Conjunctivae normal    Neck:      Vascular: No JVD  Trachea: No tracheal deviation  Cardiovascular:      Rate and Rhythm: Normal rate and regular rhythm  Heart sounds: Normal heart sounds  No murmur heard  No friction rub  No gallop  Pulmonary:      Effort: No respiratory distress  Breath sounds: Normal breath sounds  No wheezing or rales  Chest:      Chest wall: Tenderness present  Abdominal:      General: Bowel sounds are normal  There is no distension  Palpations: Abdomen is soft  Tenderness: There is no abdominal tenderness  There is no guarding  Musculoskeletal:         General: No tenderness or deformity  Normal range of motion  Right lower leg: No edema  Left lower leg: No edema  Lymphadenopathy:      Cervical: No cervical adenopathy  Skin:     General: Skin is warm and dry  Findings: No erythema or rash  Neurological:      General: No focal deficit present  Mental Status: He is alert and oriented to person, place, and time     Psychiatric:         Mood and Affect: Mood normal          Behavior: Behavior normal          Vital Signs  ED Triage Vitals [06/12/22 1013]   Temperature Pulse Respirations Blood Pressure SpO2   97 8 °F (36 6 °C) 58 20 115/70 100 %      Temp Source Heart Rate Source Patient Position - Orthostatic VS BP Location FiO2 (%)   Oral Monitor Lying Right arm --      Pain Score       4           Vitals:    06/12/22 1013   BP: 115/70   Pulse: 58   Patient Position - Orthostatic VS: Lying         Visual Acuity      ED Medications  Medications   sodium chloride 0 9 % bolus 1,000 mL (0 mL Intravenous Stopped 6/12/22 1243)   ketorolac (TORADOL) injection 30 mg (30 mg Intravenous Given 6/12/22 1130)       Diagnostic Studies  Results Reviewed     Procedure Component Value Units Date/Time    Comprehensive metabolic panel [[id number]] Collected: 06/12/22 1042    Lab Status: Final result Specimen: Blood from Arm, Left Updated: 06/12/22 1150     Sodium 138 mmol/L      Potassium 4 0 mmol/L      Chloride 103 mmol/L      CO2 30 mmol/L      ANION GAP 5 mmol/L      BUN 13 mg/dL      Creatinine 1 01 mg/dL      Glucose 93 mg/dL      Calcium 8 9 mg/dL      AST 18 U/L      ALT 16 U/L      Alkaline Phosphatase 55 U/L      Total Protein 6 7 g/dL      Albumin 3 9 g/dL      Total Bilirubin 0 40 mg/dL      eGFR 83 ml/min/1 73sq m     Narrative:      [place] guidelines for Chronic Kidney Disease (CKD):     Stage 1 with normal or high GFR (GFR > 90 mL/min/1 73 square meters)    Stage 2 Mild CKD (GFR = 60-89 mL/min/1 73 square meters)    Stage 3A Moderate CKD (GFR = 45-59 mL/min/1 73 square meters)    Stage 3B Moderate CKD (GFR = 30-44 mL/min/1 73 square meters)    Stage 4 Severe CKD (GFR = 15-29 mL/min/1 73 square meters)    Stage 5 End Stage CKD (GFR <15 mL/min/1 73 square meters)  Note: GFR calculation is accurate only with a steady state creatinine    HS Troponin 0hr (reflex protocol) [[id number]]  (Normal) Collected: 06/12/22 1042    Lab Status: Final result Specimen: Blood from Arm, Left Updated: 06/12/22 1115     hs TnI 0hr 2 ng/L     CBC and differential [[id number]]  (Abnormal) Collected: 06/12/22 1042    Lab Status: Final result Specimen: Blood from Arm, Left Updated: 06/12/22 1058     WBC 7 71 Thousand/uL      RBC 3 72 Million/uL      Hemoglobin 12 7 g/dL      Hematocrit 38 3 %       fL      MCH 34 1 pg      MCHC 33 2 g/dL      RDW 15 0 %      MPV 9 4 fL      Platelets 013 Thousands/uL      nRBC 0 /100 WBCs      Neutrophils Relative 64 %      Immat GRANS % 0 %      Lymphocytes Relative 24 %      Monocytes Relative 10 %      Eosinophils Relative 2 %      Basophils Relative 0 %      Neutrophils Absolute 4 88 Thousands/µL      Immature Grans Absolute 0 03 Thousand/uL      Lymphocytes Absolute 1 86 Thousands/µL      Monocytes Absolute 0 77 Thousand/µL      Eosinophils Absolute 0 15 Thousand/µL      Basophils Absolute 0 02 Thousands/µL                  XR chest 2 views   Final Result by Kat Wells MD (06/12 1203)      No acute cardiopulmonary disease                    Workstation performed: [id number]                    Procedures  ECG 12 Lead Documentation Only    Date/Time: 6/12/2022 10:40 AM  Performed by: Julia Mcghee PA-C  Authorized by: Julia Mcghee PA-C     Indications / Diagnosis:  Pain  ECG reviewed by me, the ED Provider: yes    Patient location:  ED  Previous ECG:     Previous ECG:  Compared to current    Comparison ECG info:  4/16/18    Similarity:  No change  Interpretation:     Interpretation: non-specific    Rate:     ECG rate:  60    ECG rate assessment: normal    Rhythm:     Rhythm: sinus rhythm    Ectopy:     Ectopy: none    QRS:     QRS axis:  Normal    QRS intervals:  Normal  Conduction:     Conduction: normal    ST segments:     ST segments:  Normal  T waves:     T waves: normal    Other findings:     Other findings: early repolarization               ED Course             HEART Risk Score    Flowsheet Row Most Recent Value   Heart Score Risk Calculator    History 0 Filed at: 06/12/2022 1210   ECG 1 Filed at: 06/12/2022 1210   Age 1 Filed at: 06/12/2022 1210   Risk Factors 1 Filed at: 06/12/2022 1210   Troponin 0 Filed at: 06/12/2022 1210   HEART Score 3 Filed at: 06/12/2022 1210                                      MDM  Number of Diagnoses or Management Options  Atypical chest pain  Diagnosis management comments: Differential diagnosis includes but not limited to:  Chest wall pain, chest wall strain, pneumothorax, acute coronary syndrome         Amount and/or Complexity of Data Reviewed  Clinical lab tests: ordered and reviewed  Tests in the radiology section of CPT®: ordered and reviewed        Disposition  Final diagnoses:   Atypical chest pain     Time reflects when diagnosis was documented in both MDM as applicable and the Disposition within this note     Time User Action Codes Description Comment    6/12/2022 12:10 PM Tricia Cutler Add [R07 89] Atypical chest pain       ED Disposition     ED Disposition   Discharge    Condition   Stable    Date/Time   Sun Jun 12, 2022 12:10 PM    Comment   Jason Pagan discharge to home/self care                 Follow-up Information    None         Discharge Medication List as of 6/12/2022 12:11 PM      CONTINUE these medications which have NOT CHANGED    Details   HYDROcodone-acetaminophen (NORCO) 5-325 mg per tablet Take 2 tablets by mouth every 6 (six) hours as needed for pain, Historical Med oxyCODONE-acetaminophen (PERCOCET)  mg per tablet Take 1 tablet by mouth every 6 (six) hours as needed for moderate pain, Historical Med             No discharge procedures on file      PDMP Review     None          ED Provider  Electronically Signed by           Liz Shannon PA-C  06/12/22 0320

## 2022-06-13 LAB
ATRIAL RATE: 61 BPM
P AXIS: 98 DEGREES
PR INTERVAL: 156 MS
QRS AXIS: 92 DEGREES
QRSD INTERVAL: 96 MS
QT INTERVAL: 410 MS
QTC INTERVAL: 412 MS
T WAVE AXIS: 52 DEGREES
VENTRICULAR RATE: 61 BPM

## 2022-06-13 PROCEDURE — 93010 ELECTROCARDIOGRAM REPORT: CPT | Performed by: INTERNAL MEDICINE

## 2022-06-29 ENCOUNTER — APPOINTMENT (OUTPATIENT)
Dept: URGENT CARE | Facility: CLINIC | Age: 57
End: 2022-06-29
Payer: OTHER MISCELLANEOUS

## 2022-06-29 PROCEDURE — 99213 OFFICE O/P EST LOW 20 MIN: CPT | Performed by: PHYSICIAN ASSISTANT

## 2022-07-20 ENCOUNTER — OCCMED (OUTPATIENT)
Dept: URGENT CARE | Facility: CLINIC | Age: 57
End: 2022-07-20
Payer: OTHER MISCELLANEOUS
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
## 2022-07-20 PROCEDURE — 99213 OFFICE O/P EST LOW 20 MIN: CPT | Performed by: PHYSICIAN ASSISTANT

## 2022-08-02 ENCOUNTER — APPOINTMENT (OUTPATIENT)
Dept: URGENT CARE | Facility: MEDICAL CENTER | Age: 57
End: 2022-08-02
Payer: OTHER MISCELLANEOUS

## 2022-08-02 PROCEDURE — 99213 OFFICE O/P EST LOW 20 MIN: CPT | Performed by: EMERGENCY MEDICINE

## 2022-09-29 ENCOUNTER — OFFICE VISIT (OUTPATIENT)
Dept: GASTROENTEROLOGY | Facility: CLINIC | Age: 57
End: 2022-09-29
Payer: COMMERCIAL

## 2022-09-29 VITALS
BODY MASS INDEX: 22.59 KG/M2 | HEART RATE: 71 BPM | SYSTOLIC BLOOD PRESSURE: 114 MMHG | DIASTOLIC BLOOD PRESSURE: 67 MMHG | HEIGHT: 70 IN | WEIGHT: 157.8 LBS

## 2022-09-29 DIAGNOSIS — R10.84 GENERALIZED ABDOMINAL PAIN: ICD-10-CM

## 2022-09-29 DIAGNOSIS — R19.4 CHANGE IN BOWEL HABIT: Primary | ICD-10-CM

## 2022-09-29 PROCEDURE — 99204 OFFICE O/P NEW MOD 45 MIN: CPT | Performed by: INTERNAL MEDICINE

## 2022-09-29 RX ORDER — GABAPENTIN 100 MG/1
100 CAPSULE ORAL
COMMUNITY
Start: 2022-09-28 | End: 2023-09-28

## 2022-09-29 RX ORDER — DICYCLOMINE HCL 20 MG
20 TABLET ORAL EVERY 6 HOURS
Qty: 190 TABLET | Refills: 3 | Status: SHIPPED | OUTPATIENT
Start: 2022-09-29

## 2022-09-29 NOTE — PROGRESS NOTES
[facility] - Outpatient Consultation  Anurag Pratt 49 y o  male MRN: [id number]  Encounter: [id number]        ASSESSMENT AND PLAN:       Diagnoses and all orders for this visit:    Change in bowel habit  Generalized abdominal pain  Sudden onset of symptoms sounds likely infectious in etiology with possible postinfectious functional disorder  Differential diagnosis, however, is broad  Will begin with laboratory testing as below and try an empiric course of dicyclomine  Will obtain records of his prior endoscopic evaluation though if symptoms persist may consider repeating this  Contingency might also include abdominal ultrasound or CT  Will follow-up here in several weeks or sooner    -     CBC; Future  -     Comprehensive metabolic panel; Future  -     Lipase; Future  -     Stool culture; Future  -     Calprotectin,Fecal; Future  -     C-reactive protein; Future  -     H  pylori antigen, stool; Future  -     Celiac Disease Panel; Future  -     dicyclomine (BENTYL) 20 mg tablet; Take 1 tablet (20 mg total) by mouth every 6 (six) hours          ______________________________________________________________________    HPI:  Patient reports he had fairly abrupt onset of abdominal bloating and discomfort several weeks ago which persisted for a few days but ever since then he has had persistent  mid abdominal pain which never completely goes away as well as a change in his bowel habit described as initial scybala followed by liquid stool  Has not had an increase in the frequency of bowel movements or blood in his bowel movements  No rash, vomiting, fever chills, jaundice  No recent travel or ill contacts  Has not had COVID in the past to his knowledge  Did have what sounds like a prior severe gastroenteritis that led to hospitalization after a trip to the [place] a number of years ago in underwent EGD and colonoscopy at that time though records are not currently available    Does not take any NSAIDs  Drinks alcohol on occasion no family history of gastrointestinal disease      REVIEW OF SYSTEMS:    Review of Systems   Constitutional: Positive for malaise/fatigue  All other systems reviewed and are negative  Historical Information   Past Medical History:   Diagnosis Date    Back pain     Pinched nerve     Rheumatoid arthritis ([address] )      Past Surgical History:   Procedure Laterality Date    COLONOSCOPY      SURGERY SCROTAL / TESTICULAR Right     right undescended testicle as child    UPPER GASTROINTESTINAL ENDOSCOPY       Social History   Social History     Substance and Sexual Activity   Alcohol Use Yes    Alcohol/week: 21 0 standard drinks    Types: 21 Cans of beer per week    Comment: 3 large cans per day     Social History     Substance and Sexual Activity   Drug Use Yes    Types: Marijuana     Social History     Tobacco Use   Smoking Status Light Tobacco Smoker    Packs/day: 0 50   Smokeless Tobacco Never Used     History reviewed  No pertinent family history  Meds/Allergies       Current Outpatient Medications:     dicyclomine (BENTYL) 20 mg tablet    gabapentin (NEURONTIN) 100 mg capsule    HYDROcodone-acetaminophen (NORCO) 5-325 mg per tablet    oxyCODONE-acetaminophen (PERCOCET)  mg per tablet    No Known Allergies        Objective     Blood pressure 114/67, pulse 71, height 5' 10" (1 778 m), weight 71 6 kg (157 lb 12 8 oz)  Body mass index is 22 64 kg/m²  PHYSICAL EXAM:      Physical Exam  Vitals and nursing note reviewed  Constitutional:       General: He is not in acute distress  HENT:      Head: Normocephalic and atraumatic  Mouth/Throat:      Mouth: Mucous membranes are moist    Eyes:      General: No scleral icterus  Pupils: Pupils are equal, round, and reactive to light  Cardiovascular:      Rate and Rhythm: Normal rate and regular rhythm  Pulmonary:      Effort: Pulmonary effort is normal  No respiratory distress  Abdominal:      General: There is no distension  Palpations: Abdomen is soft  There is no mass  Tenderness: There is no abdominal tenderness  There is no guarding or rebound  Musculoskeletal:         General: Normal range of motion  Cervical back: Normal range of motion and neck supple  Skin:     General: Skin is warm and dry  Neurological:      General: No focal deficit present  Mental Status: He is alert and oriented to person, place, and time  Psychiatric:         Mood and Affect: Mood normal          Behavior: Behavior normal               Lab Results:   No visits with results within 1 Day(s) from this visit     Latest known visit with results is:   Admission on 06/12/2022, Discharged on 06/12/2022   Component Date Value    WBC 06/12/2022 7 71     RBC 06/12/2022 3 72 (A)    Hemoglobin 06/12/2022 12 7     Hematocrit 06/12/2022 38 3     MCV 06/12/2022 103 (A)    MCH 06/12/2022 34 1     MCHC 06/12/2022 33 2     RDW 06/12/2022 15 0     MPV 06/12/2022 9 4     Platelets 43/16/0746 204     nRBC 06/12/2022 0     Neutrophils Relative 06/12/2022 64     Immat GRANS % 06/12/2022 0     Lymphocytes Relative 06/12/2022 24     Monocytes Relative 06/12/2022 10     Eosinophils Relative 06/12/2022 2     Basophils Relative 06/12/2022 0     Neutrophils Absolute 06/12/2022 4 88     Immature Grans Absolute 06/12/2022 0 03     Lymphocytes Absolute 06/12/2022 1 86     Monocytes Absolute 06/12/2022 0 77     Eosinophils Absolute 06/12/2022 0 15     Basophils Absolute 06/12/2022 0 02     Sodium 06/12/2022 138     Potassium 06/12/2022 4 0     Chloride 06/12/2022 103     CO2 06/12/2022 30     ANION GAP 06/12/2022 5     BUN 06/12/2022 13     Creatinine 06/12/2022 1 01     Glucose 06/12/2022 93     Calcium 06/12/2022 8 9     AST 06/12/2022 18     ALT 06/12/2022 16     Alkaline Phosphatase 06/12/2022 55     Total Protein 06/12/2022 6 7     Albumin 06/12/2022 3 9     Total Bilirubin 06/12/2022 0 40     eGFR 06/12/2022 83     hs TnI 0hr 06/12/2022 2     Ventricular Rate 06/12/2022 61     Atrial Rate 06/12/2022 61     AR Interval 06/12/2022 156     QRSD Interval 06/12/2022 96     QT Interval 06/12/2022 410     QTC Interval 06/12/2022 412     P Axis 06/12/2022 98     QRS Axis 06/12/2022 92     T Wave Axis 06/12/2022 52          Radiology Results:   No results found

## 2022-11-18 ENCOUNTER — TELEPHONE (OUTPATIENT)
Dept: GASTROENTEROLOGY | Facility: CLINIC | Age: 57
End: 2022-11-18

## 2022-11-18 ENCOUNTER — OFFICE VISIT (OUTPATIENT)
Dept: GASTROENTEROLOGY | Facility: CLINIC | Age: 57
End: 2022-11-18

## 2022-11-18 VITALS
HEIGHT: 70 IN | BODY MASS INDEX: 23.11 KG/M2 | DIASTOLIC BLOOD PRESSURE: 70 MMHG | WEIGHT: 161.4 LBS | SYSTOLIC BLOOD PRESSURE: 118 MMHG | HEART RATE: 75 BPM

## 2022-11-18 DIAGNOSIS — R10.84 GENERALIZED ABDOMINAL PAIN: ICD-10-CM

## 2022-11-18 DIAGNOSIS — Z86.010 HISTORY OF ADENOMATOUS POLYP OF COLON: Primary | ICD-10-CM

## 2022-11-18 DIAGNOSIS — R19.4 CHANGE IN BOWEL HABIT: ICD-10-CM

## 2022-11-18 RX ORDER — POLYETHYLENE GLYCOL 3350, SODIUM SULFATE ANHYDROUS, SODIUM BICARBONATE, SODIUM CHLORIDE, POTASSIUM CHLORIDE 236; 22.74; 6.74; 5.86; 2.97 G/4L; G/4L; G/4L; G/4L; G/4L
4000 POWDER, FOR SOLUTION ORAL ONCE
Qty: 4000 ML | Refills: 0 | Status: SHIPPED | OUTPATIENT
Start: 2022-11-18 | End: 2022-11-18

## 2022-11-18 NOTE — TELEPHONE ENCOUNTER
Scheduled date of colonoscopy (as of today):12/6/22  Physician performing colonoscopy: Willy Hansen  Location of colonoscopy: [facility]  Bowel prep reviewed with patient: GoLytely  Instructions reviewed with patient by:Dianna GRECO  Clearances: None

## 2022-11-18 NOTE — PROGRESS NOTES
[facility] - Outpatient Follow-up Note  Melyssa Grimaldo 56 y o  male MRN: [id number]  Encounter: [id number]          ASSESSMENT AND PLAN:      1  History of adenomatous polyp of colon  Overdue for surveillance colonoscopy  Will arrange for this at his convenience  - Colonoscopy; Future  - polyethylene glycol (Golytely) 4000 mL solution; Take 4,000 mL by mouth once for 1 dose Take 4000 mL by mouth once for 1 dose  Use as directed  Dispense: 4000 mL; Refill: 0    2  Change in bowel habit  3  Generalized abdominal pain  Resolved  Likely functional, possibly post infectious  Continue dicyclomine as needed    ______________________________________________________________________    SUBJECTIVE:  Patient returns in follow-up of change in bowel habit, abdominal pain  His symptoms have essentially resolved now with dicyclomine  He is feeling well with no complaints  Prior EGD and colonoscopy  reviewed and revealed a 10 mm tubular adenoma in the colon and biopsies positive for H pylori  Labs performed after his most recent visit were unrevealing with negative H pylori fecal antigen, normal calprotectin, normal CRP, negative sprue serologies, negative culture        REVIEW OF SYSTEMS:    ROS       Historical Information   Past Medical History:   Diagnosis Date   • Back pain    • Pinched nerve    • Rheumatoid [address] ([facility] [address] )      Past Surgical History:   Procedure Laterality Date   • COLONOSCOPY     • SURGERY SCROTAL / TESTICULAR Right     right undescended testicle as child   • UPPER GASTROINTESTINAL ENDOSCOPY       Social History   Social History     Substance and Sexual Activity   Alcohol Use Yes   • Alcohol/week: 21 0 standard drinks   • Types: 21 Cans of beer per week    Comment: 3 large cans per day     Social History     Substance and Sexual Activity   Drug Use Yes   • Types: Marijuana     Social History     Tobacco Use   Smoking Status Light Smoker   • Packs/day: 0 50   • Types: Cigarettes   Smokeless Tobacco Never     History reviewed  No pertinent family history  Meds/Allergies       Current Outpatient Medications:   •  dicyclomine (BENTYL) 20 mg tablet  •  gabapentin (NEURONTIN) 100 mg capsule  •  HYDROcodone-acetaminophen (NORCO) 5-325 mg per tablet  •  polyethylene glycol (Golytely) 4000 mL solution  •  oxyCODONE-acetaminophen (PERCOCET)  mg per tablet    No Known Allergies        Objective     Blood pressure 118/70, pulse 75, height 5' 10" (1 778 m), weight 73 2 kg (161 lb 6 4 oz)  Body mass index is 23 16 kg/m²  PHYSICAL EXAM:      Physical Exam     Lab Results:   No visits with results within 1 Day(s) from this visit     Latest known visit with results is:   Admission on 06/12/2022, Discharged on 06/12/2022   Component Date Value   • WBC 06/12/2022 7 71    • RBC 06/12/2022 3 72 (L)    • Hemoglobin 06/12/2022 12 7    • Hematocrit 06/12/2022 38 3    • MCV 06/12/2022 103 (H)    • MCH 06/12/2022 34 1    • MCHC 06/12/2022 33 2    • RDW 06/12/2022 15 0    • MPV 06/12/2022 9 4    • Platelets 21/15/8451 204    • nRBC 06/12/2022 0    • Neutrophils Relative 06/12/2022 64    • Immat GRANS % 06/12/2022 0    • Lymphocytes Relative 06/12/2022 24    • Monocytes Relative 06/12/2022 10    • Eosinophils Relative 06/12/2022 2    • Basophils Relative 06/12/2022 0    • Neutrophils Absolute 06/12/2022 4 88    • Immature Grans Absolute 06/12/2022 0 03    • Lymphocytes Absolute 06/12/2022 1 86    • Monocytes Absolute 06/12/2022 0 77    • Eosinophils Absolute 06/12/2022 0 15    • Basophils Absolute 06/12/2022 0 02    • Sodium 06/12/2022 138    • Potassium 06/12/2022 4 0    • Chloride 06/12/2022 103    • CO2 06/12/2022 30    • ANION GAP 06/12/2022 5    • BUN 06/12/2022 13    • Creatinine 06/12/2022 1 01    • Glucose 06/12/2022 93    • Calcium 06/12/2022 8 9    • AST 06/12/2022 18    • ALT 06/12/2022 16    • Alkaline Phosphatase 06/12/2022 55    • Total Protein 06/12/2022 6 7    • Albumin 06/12/2022 3 9    • Total Bilirubin 06/12/2022 0 40    • eGFR 06/12/2022 83    • hs TnI 0hr 06/12/2022 2    • Ventricular Rate 06/12/2022 61    • Atrial Rate 06/12/2022 61    • NV Interval 06/12/2022 156    • QRSD Interval 06/12/2022 96    • QT Interval 06/12/2022 410    • QTC Interval 06/12/2022 412    • P Axis 06/12/2022 98    • QRS Axis 06/12/2022 92    • T Wave Axis 06/12/2022 52          Radiology Results:   No results found

## 2022-11-22 ENCOUNTER — ANESTHESIA (OUTPATIENT)
Dept: ANESTHESIOLOGY | Facility: AMBULATORY SURGERY CENTER | Age: 57
End: 2022-11-22

## 2022-11-22 ENCOUNTER — ANESTHESIA EVENT (OUTPATIENT)
Dept: ANESTHESIOLOGY | Facility: AMBULATORY SURGERY CENTER | Age: 57
End: 2022-11-22

## 2022-11-28 ENCOUNTER — TELEPHONE (OUTPATIENT)
Dept: FAMILY MEDICINE CLINIC | Facility: CLINIC | Age: 57
End: 2022-11-28

## 2022-11-29 ENCOUNTER — TELEPHONE (OUTPATIENT)
Dept: GASTROENTEROLOGY | Facility: CLINIC | Age: 57
End: 2022-11-29

## 2022-11-29 NOTE — TELEPHONE ENCOUNTER
11/29/22 5:29 PM        The office's request has been received, reviewed, and the patient chart updated  The PCP has successfully been removed with a patient attribution note  This message will now be completed          Thank you  Kevyn Weaver

## 2022-11-29 NOTE — TELEPHONE ENCOUNTER
lmom confirming pt's colonoscopy scheduled on 12/6/22 at [facility] with Dr Clary Rodas  Informed [facility] would be calling the day prior with the arrival time  Informed of clear liquid diet day prior as well as the bowel cleansing preparation  Informed would need a  the day of the procedure due to being under sedation  I asked pt to please call back if has not received instructions or if has any questions  Advised pt to contact insurance if has any questions regarding coverage for procedure